# Patient Record
Sex: MALE | Race: WHITE | URBAN - METROPOLITAN AREA
[De-identification: names, ages, dates, MRNs, and addresses within clinical notes are randomized per-mention and may not be internally consistent; named-entity substitution may affect disease eponyms.]

---

## 2021-03-22 ENCOUNTER — WEB ENCOUNTER (OUTPATIENT)
Dept: URBAN - METROPOLITAN AREA CLINIC 70 | Facility: CLINIC | Age: 26
End: 2021-03-22

## 2021-03-22 ENCOUNTER — LAB OUTSIDE AN ENCOUNTER (OUTPATIENT)
Dept: URBAN - METROPOLITAN AREA CLINIC 70 | Facility: CLINIC | Age: 26
End: 2021-03-22

## 2021-03-22 ENCOUNTER — OFFICE VISIT (OUTPATIENT)
Dept: URBAN - METROPOLITAN AREA CLINIC 70 | Facility: CLINIC | Age: 26
End: 2021-03-22
Payer: COMMERCIAL

## 2021-03-22 ENCOUNTER — DASHBOARD ENCOUNTERS (OUTPATIENT)
Age: 26
End: 2021-03-22

## 2021-03-22 VITALS
HEIGHT: 72 IN | BODY MASS INDEX: 42.66 KG/M2 | TEMPERATURE: 97.3 F | WEIGHT: 315 LBS | SYSTOLIC BLOOD PRESSURE: 137 MMHG | DIASTOLIC BLOOD PRESSURE: 69 MMHG | HEART RATE: 74 BPM

## 2021-03-22 DIAGNOSIS — K21.9 GERD WITHOUT ESOPHAGITIS: ICD-10-CM

## 2021-03-22 DIAGNOSIS — R12 HEARTBURN: ICD-10-CM

## 2021-03-22 DIAGNOSIS — R10.84 GENERALIZED ABDOMINAL PAIN: ICD-10-CM

## 2021-03-22 PROCEDURE — 99204 OFFICE O/P NEW MOD 45 MIN: CPT | Performed by: NURSE PRACTITIONER

## 2021-03-22 RX ORDER — GABAPENTIN 300 MG/1
(PRIOR AUTH#:000006351375) CAPSULE ORAL
Qty: 90 CAP | Status: ACTIVE | COMMUNITY

## 2021-03-22 RX ORDER — DICLOFENAC SODIUM 75 MG/1
(PRIOR AUTH#:000006357848) TABLET, DELAYED RELEASE ORAL
Qty: 30 DELAYED RELEASE TABLET | Status: ACTIVE | COMMUNITY

## 2021-03-22 RX ORDER — FAMOTIDINE 40 MG/1
1 TABLET AT BEDTIME TABLET, FILM COATED ORAL ONCE A DAY
Qty: 90 | Refills: 3 | OUTPATIENT
Start: 2021-03-22

## 2021-03-22 RX ORDER — PANTOPRAZOLE 40 MG/1
1 TABLET TABLET, DELAYED RELEASE ORAL TWICE DAILY
Qty: 180 | Refills: 3 | OUTPATIENT

## 2021-03-22 RX ORDER — PANTOPRAZOLE 40 MG/1
(PRIOR AUTH#:000006357853) TABLET, DELAYED RELEASE ORAL
Qty: 30 DELAYED RELEASE TABLET | Status: ACTIVE | COMMUNITY

## 2021-03-22 RX ORDER — SUCRALFATE 1 G/1
1 TABLET ON AN EMPTY STOMACH TABLET ORAL
Qty: 120 | Refills: 0
End: 2021-04-21

## 2021-03-22 RX ORDER — SUCRALFATE 1 G/10ML
(PRIOR AUTH#:000006361795) SUSPENSION ORAL
Qty: 1200 UNSPECIFIED | Status: ACTIVE | COMMUNITY

## 2021-03-22 NOTE — HPI-TODAY'S VISIT:
Pt is a 26 yr old male whom presents today as a f/u after presentation to ED at [facility] on 3/2/21. Pt went to ED for report of epigastric pain. Labs found Hgb 10.5 and LFT/lipase wnl. Today he reports heartburn and epigastric pain which is intermittent and moderate to severe.  Pain began 2 months ago. Also reports lower abdominal pain as well to LLQ and RLQ which is dull ache.  No recent imaging. Denies rectal bleeding or abnormal wt loss.  he has lost 20# but has been trying to lose weight. Denies N/v or pain with eating/drinking. Has reflux into throat and heartburn with laying flat. Protonix and Carafate started in ED has been ineffective in improving symptoms to date. He has never had endoscopy.

## 2021-04-09 PROBLEM — 266435005: Status: ACTIVE | Noted: 2021-03-22

## 2021-05-10 ENCOUNTER — OFFICE VISIT (OUTPATIENT)
Dept: URBAN - METROPOLITAN AREA SURGERY CENTER 24 | Facility: SURGERY CENTER | Age: 26
End: 2021-05-10

## 2021-05-10 RX ORDER — PANTOPRAZOLE 40 MG/1
1 TABLET TABLET, DELAYED RELEASE ORAL TWICE DAILY
Qty: 180 | Refills: 3 | Status: ACTIVE | COMMUNITY

## 2021-05-10 RX ORDER — GABAPENTIN 300 MG/1
(PRIOR AUTH#:000006351375) CAPSULE ORAL
Qty: 90 CAP | Status: ACTIVE | COMMUNITY

## 2021-05-10 RX ORDER — DICLOFENAC SODIUM 75 MG/1
(PRIOR AUTH#:000006357848) TABLET, DELAYED RELEASE ORAL
Qty: 30 DELAYED RELEASE TABLET | Status: ACTIVE | COMMUNITY

## 2021-05-10 RX ORDER — FAMOTIDINE 40 MG/1
1 TABLET AT BEDTIME TABLET, FILM COATED ORAL ONCE A DAY
Qty: 90 | Refills: 3 | Status: ACTIVE | COMMUNITY
Start: 2021-03-22